# Patient Record
Sex: MALE | URBAN - METROPOLITAN AREA
[De-identification: names, ages, dates, MRNs, and addresses within clinical notes are randomized per-mention and may not be internally consistent; named-entity substitution may affect disease eponyms.]

---

## 2024-03-19 ENCOUNTER — TELEPHONE (OUTPATIENT)
Age: 44
End: 2024-03-19

## 2024-03-19 NOTE — TELEPHONE ENCOUNTER
Called and spoke with Yohana, asked her if should would be able to fax us the biopsy results, she said she is going to call the doctor and have them fax them over to us.

## 2024-03-19 NOTE — TELEPHONE ENCOUNTER
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 Please advise where we can get this patient scheduled. [phone]

## 2024-03-20 NOTE — TELEPHONE ENCOUNTER
Yohana caldera's husb calling to check status of fax    Advised I did not see anything in his chart     Verified fax # with her, apparently wrong # was given    Instructed her to have prov refax to us

## 2024-03-25 NOTE — TELEPHONE ENCOUNTER
Rec'd call from patients wife checking to see if paperwork was yet received for her ? I stated that it had not. There is nothing in his chart.    She stated that this is the third time that she's call and we don't have it. Should she just go somewhere else?     I informed her that no matter where she went, records would be required.    She wanted to confirm fax number but couldn't find her table.    She'll call back.

## 2024-03-28 NOTE — TELEPHONE ENCOUNTER
Path report received via e-mail. Forwarded to office to print and scan into chart to send to MOHs / clinical team.

## 2024-03-28 NOTE — TELEPHONE ENCOUNTER
Issues with e-mail received (?). Requested patient's wife resend (with my apologies) so I can get physician to review.

## 2024-04-02 ENCOUNTER — TELEPHONE (OUTPATIENT)
Dept: DERMATOLOGY | Facility: CLINIC | Age: 44
End: 2024-04-02

## 2024-04-02 NOTE — LETTER
Dago Warner     1980    [address] [address]    Dear Dago Warner,    You are scheduled to have the MOHS procedure on May 15, 2024 at 11 am for middle finger with Dr.Ryan Layton. Our office is located in [facility] our address is [address] [place] [address]. Once you arrive please check in with our front staff in [address] and they will escort you to the MOHS waiting room.  If you have someone bringing you to your appointment they may wait in the waiting room or accompany you in your visit.      Below you will find some pre-op instructions along with some information regarding the MOHS procedure.     If you have any questions please call our office at [phone].       Thank you,    [facility] MOHS Department         PRE-OPERATIVE INSTRUCTIONS - MOHS    Before your scheduled surgery, there are a number of important precautions and positive steps you should take to help prepare yourself for a successful treatment and speedy recovery.    Some of the steps, which are listed below, may seem unnecessary and inconvenient, but they are important. For example, when you stop smoking, you increase your ability to heal. Occasionally, there may be valid reasons, personal or medical, why you can't comply. In such cases, please call the office so we can discuss possible ways to overcome any obstacles you may be encountering.    If you have any questions about the surgery, or remember additional medical information that you forgot to mention to our staff, please contact the office prior to your surgery.    GENERAL INFORMATION REGARDING MOHS MICROGRAPHIC SURGERY    Mohs surgery is a specialized technique for the removal of skin cancer developed by Dr. Frederick Mohs over 50 years ago to improve the cure rates of skin cancer. Traditionally, skin cancers are treated by destructive methods (radiation, freezing, scraping, and burning)  or excision (cutting out the tissue with standards margins and sending it to an outside laboratory for testing). These methods all yield cure rates between 65%-94%. However, for cancers located in cosmetically sensitive areas, large tumors, or tumors unsuccessfully treated by other means, Mohs surgery offers a higher cure rate. In most cases, Mohs surgery provides you with a 99% cure rate for primary (previously untreated) basal cell cancer and a 95% cure rate for primary squamous cell cancer. In Mohs surgery, tissue is removed and processed in a way that we are able to check 100% of the margins, giving the highest cure rate for any method of treating skin cancers while providing maximal preservation of normal skin. This allows the surgeon to produce an optimal cosmetic result for the patient by maximizing the amount of tissue removed yielding as small a scar as possible    On the day of surgery, you will be given local anesthesia only (similar to what was given to you during your initial biopsy). You will remain awake. You will verify the location of the skin cancer prior to the onset of the surgery. Once the area is numb, the tissue containing the skin cancer will be removed, taking a small safety margin. This margin is usually smaller than what would be taken with a standard excision. Once the tissue is removed, it is marked and oriented. The first layer (“Stage I”) will be processed in our laboratory. The wound will be treated for bleeding and a bandage will be placed to keep you comfortable while you wait an approximate 45 minutes-1 hour (for the processing of the tissue) in your room. Your Mohs surgeon will examine the pathology in the lab, checking all the margins. If any tumor remains, you will need to take a second layer of skin (“Stage 2”). The area will be re-anesthetized and your Mohs surgeon will remove more skin only in the area where the tumor exists. This process will continue until all the skin  cancer is removed. Unfortunately, there is no method to predict how many layers or stages will be taken.    Once the tumor has been removed completely, we will discuss the best ways to close the defect. Most wounds may be closed with stitches. A larger wound may require a skin graft or a flap. In rare instances, especially for cancers around the eye or for larger cancers, we may work with another surgeon (oculoplastic, ENT, plastics) with special skills to assist with reconstruction.    Medications: Please take all your normal medications the morning of your surgery. If you are a diabetic, please bring your insulin or medications with you, as well as a snack to avoid having low blood sugar during your day with us.     Blood Thinners    VERY IMPORTANT: We do NOT stop or hold prescribed blood thinners (such as Coumadin/Warfarin, Plavix, Eliquis, Pradaxa, Brilinta, Apixaban, Xarelto, Lovonox, Rivaroxaban, or Aggrenox) before Mohs surgery. Additionally, If you take aspirin because you have had a stroke, heart attack, heart disease, other condition, or your physician has prescribed you to take it, please continue your aspirin.    Although there is a risk of increased bruising and bleeding, we are still able to safely perform surgery while continuing these medications. Please NEVER stop your prescribed blood thinner without the managing doctor's (the doctor that prescribed the medication) permission or knowledge. If you have any concerns about not holding your blood thinner, please address these with your Mohs surgeon.    Most people should stop all non-steroidal anti-inflammatory medications (Motrin, Naproxen, Advil, Midol, Aleve, etc.) for 7 days prior to your scheduled surgery and 2 days after (unless instructed otherwise after surgery).  You may take Tylenol for pain.     Antibiotics  If you usually require antibiotics prior to dental work, please let the office know at least 24 hours prior to your surgery. Medical  conditions that sometimes require preoperative antibiotics include artificial heart valves, heart murmurs, artificial joints, and related problems. We may give you a different medication than the dentist, so please contact us for the correct antibiotic.  If you were prescribed pre-operative antibiotics by our office, please take the medication 2 hours prior to your procedure.    Vitamins and Supplements  Avoid taking any supplements with Vitamin E, Fish Oil, Gingko, Ginseng, and Garlic for 2 weeks before and 2 days after your surgery. These thin your blood.    Alcohol: Avoid drinking alcohol for 2 days prior to your surgery, and for 2 days afterwards (it thins the blood and causes more bruising and swelling).    Smoking: Try to STOP or reduce smoking significantly the week before your surgery, and especially the week afterwards (it greatly improves how well you heal). Tobacco smoke deprives the blood of oxygen, which is urgently needed by the wound during the healing process.    Contact Lenses: Do not wear them on the day of the surgery. Instead, wear glasses and bring your case, in case we need to remove them.    Clothing: Do not wear your nicest clothing on your surgery day. We recommend wearing a button down shirt that will not disrupt your post-operative dressing when changing later that night.    Bathing: On the morning of your surgery, you may bathe or shower normally. If you get your hair done on a weekly basis, remember to get your hair washed the day before surgery.   - You will need to keep your surgical site dry for a minimum of 48 hours after surgery.    Makeup: If your surgery is on the face, please do not wear any makeup on the day of the surgery.    Jewelry: Please try to avoid wearing jewelry on the day of surgery.    Food: On the morning of surgery, have breakfast but limit your intake of caffeinated beverages. They are diuretic and may inconvenience you during surgery. If you are following up with  another surgeon the same day as your Mohs surgery, you must receive permission to eat breakfast from that surgeon.      What to bring with you on the day of your surgery:  Bring snacks - Since you could be at the office long, you may bring snacks and/or lunch with you. Some snacks and drinks are available at the office as well.   Bring a sweater - Bring a sweater or jacket that buttons or zips down the front and will not disturb your wound dressing during removal.  Bring something to do - You will be spending much of the day in our office. There will be 45-60 minute waiting periods  between layers/stages, and while there is a television with cable in every room, it is nice to have something to keep you occupied such as books, magazines, knitting, music, or work.     Planning Ahead:  Other Appointments - It is important to realize that no matter how small the skin cancer appears to be, looks can be deceiving. Since your surgery may last the entire day, you should not schedule any other appointments that day.  Special Occasions - Surgery often creates swelling and bruising. Also, the post-op dressing may be rather large and obvious. Keep this in mind as you arrange your social/work schedule. If an important event is already planned, please check with your referring physician or your Mohs surgeon to see if the surgery can be postponed.  Activity Limits after Surgery - If surgery was performed on your face, we recommend that you keep your activity level to a minimum for 2-3 days (the blood pressure elevation related to exercise can lead to bleeding). If you have stitches in an area that will be under tension or significant movement (neck, back, arms, legs), you will need to avoid heavy lifting (anything over 5 lbs) or exercise for at least 2 weeks and possibly longer. We also advise that you limit out of town travel for the first 7 days after surgery. You should also wait at least 7 days before going into a pool or the  ocean.  Housework - Since you will need to minimize activity after surgery, plan to do your groceries, laundry, gardening, and other heavy household chores prior to your surgery. Please make arrangements for assistance during the post-op period. If surgery is around your mouth area, you may need to eat soft foods, such as soup, milkshakes, or yogurt for 48 hours.    Purchasing bandage supplies: Prior to surgery, please purchase the following items to care for your surgical wound properly.  Cotton swabs (Q-tips)  Vaseline or Aquaphor  Telfa pads (or any non-stick dressing)  Paper tape or Hypafix tape  Gauze pads (3x3)      We will provide you with some bandage supplies after surgery to get you started.    Transportation: It is often reassuring and comforting to have a  drive you to and from the surgery. He or she is welcome to wait in the office during the surgery. Please note that for safety reasons, only the patient is allowed in the procedure room during surgery. Thank you for your cooperation.    Rescheduling: If you need to reschedule your surgery, please notify the office as soon as possible.

## 2024-04-02 NOTE — TELEPHONE ENCOUNTER
Pre- operative Mohs Telephone Scheduling Note    Do you have a pacemaker, defibrillator, spinal or brain stimulator? no    Do you take antibiotics before skin or dental procedures? no  If yes, will likely require pre-operative antibiotics. Ask  the patient why they take the antibiotics (usually because of joint replacement).    Do you have a history of a joint replacements within the past 2 years? no   If yes, will likely require pre-operative antibiotics. Ask if orthopaedic surgeon has prescribed pre-operative antibiotics to take before procedures/dental work?    Do you take any OTC medications that thin your blood (Aspirin, Aleve, Ibuprofen) or supplements that thin your blood (fish oil, garlic, vitamin E, Ginko Biloba)? no    Do you take any prescribed medications that thin your blood (Coumadin, Plavix, Xarelto, Eliquis or another prescribed blood thinner)? no    Do you have an allergy to lidocaine or epinephrine? no    Do you have allergies to Iodine? no    Do you wear a lidocaine patch? no    Have you ever been diagnosed with HIV, AIDS, Hep B and Hep C? no    Do you use a cane, walker or wheelchair? no    Is the patient from a nursing home? no If yes, Is there any special accommodations that is needed for patient n/a    Do you smoke? yes: pack per day      If yes,  patient to try and stop 2 days before surgery and 7 days after the surgery. Minimizing smoking as much as possible during this time will improve healing and the cosmetic result after surgery.    Do you use supplemental oxygen? If so, how many liters and can you be off it for a short period of time? N/a    Date scheduled: May 15, 2024 at 11 am with Dr. Layton    Coordination of Care with other provider (Oculoplastics, Plastics, ENT) required? no   IF YES, PLEASE FORWARD TO APPROPRIATE PERSONNEL TO HELP COORDINATE.    Are there remaining tumors to be scheduled? no    Was Prior Authorization obtained? No (please use [email or web address] to document  prior auth)

## 2024-05-15 ENCOUNTER — PROCEDURE VISIT (OUTPATIENT)
Dept: DERMATOLOGY | Facility: CLINIC | Age: 44
End: 2024-05-15
Payer: COMMERCIAL

## 2024-05-15 VITALS
WEIGHT: 209 LBS | SYSTOLIC BLOOD PRESSURE: 116 MMHG | HEART RATE: 90 BPM | DIASTOLIC BLOOD PRESSURE: 62 MMHG | TEMPERATURE: 97.6 F | OXYGEN SATURATION: 97 %

## 2024-05-15 DIAGNOSIS — D04.61 SQUAMOUS CELL CARCINOMA IN SITU (SCCIS) OF SKIN OF FINGER OF RIGHT HAND: Primary | ICD-10-CM

## 2024-05-15 PROCEDURE — 17311 MOHS 1 STAGE H/N/HF/G: CPT | Performed by: DERMATOLOGY

## 2024-05-15 RX ORDER — GEMFIBROZIL 600 MG/1
TABLET, FILM COATED ORAL
COMMUNITY

## 2024-05-15 NOTE — PROGRESS NOTES
MOHS Procedure Note    Patient: Dago Warner  : 1980  MRN: [id number]  Date: 5/15/2024    History of Present Illness: The patient is a 44 y.o. 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     Past Medical History:   Diagnosis Date    Squamous cell skin cancer 2024    finger, mohs       Past Surgical History:   Procedure Laterality Date    MOHS SURGERY  05/15/2024    SCCIS finger, Dr Layton         Current Outpatient Medications:     gemfibrozil (LOPID) 600 mg tablet, take 1 tablet by mouth twice a day for 90 days, Disp: , Rfl:     metFORMIN (GLUCOPHAGE) 1000 MG tablet, Take 1,000 mg by mouth 2 (two) times a day with meals, Disp: , Rfl:     No Known Allergies    Physical Exam:   Vitals:    05/15/24 1050   BP: 116/62   Pulse: 90   Temp: 97.6 °F (36.4 °C)   SpO2: 97%     General: Awake, Alert, Oriented x 3, Mood and affect appropriate  Respiratory: Respirations even and unlabored  Cardiovascular: Peripheral pulses intact; no edema  Musculoskeletal Exam: n/a    Skin: 1.2 cm x 0.9 cm scaly pink macule    Assessment: Biopsy confirmed 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     Plan: Mohs    Time of H&P Completion:1051    MOHS Procedure Timeout      Flowsheet Row Most Recent Value   Timeout: 1103   Patient Identity Verified: Yes   Correct Site Verified: Yes   Correct Procedure Verified: Yes            MOHS Diagnosis/Indication/Location/ID      Flowsheet Row Most Recent Value   Pathology Type Squamous cell carcinoma   Anatomic Site --  [finger]   Indications for MOHS tumor location   MOHS ID [id number]            MOHS Site/Accession/Pre-Post      Flowsheet Row Most Recent Value   Original Site Identified (as submitted by referring clinician) Photo, Referral   Biopsy Accession/Specimen # (as submitted by referring clincian) [id number]   Pre-MOHS Size Length (cm) 1.2   Pre-MOHS Size Width (cm) 0.9   Post-MOHS Size-Length (cm) 1.2   Post MOHS Size-Width (cm) 1    Repair Type Second intention   Anesthetic Used 1% Lidocaine with epinephrine  [1.4]            MOHS Tumor Stage 1 Information      Flowsheet Row Most Recent Value   Tissue Sections (blocks) 2   Microscopic Exam Section 1: No tumor identified in section.   Microscopic Exam Section 2: No tumor identified in section.   Tumor Clear After Stage I? Yes                    Patient identified, procedure verified, site identified and verified. Time out completed. Surgical removal of the lesion discussed with the patient (risks and benefits, including possibility of scarring, infection, recurrence or potential for further treatment)  I have specifically identified the site with the patient. I have discussed the fact that the patient will have a scar after the procedure regardless of granulation or repair with sutures. I have discussed that the repair options can range from granulation in some cases to linear or curvilinear closures to larger flaps or grafts.  There are sometimes flaps or grafts used that require multiples stages of surgery and will not be completed today, rather be completed over a series of appointments. I have discussed that occasionally due to location, size or depth of the lesion I may recommend consultation with and transfer of care for further removal or the reconstruction to another provider such as ophthalmology surgery, plastic surgery, ENT surgery, or surgical oncology. There are cases in which other testing such as imaging with MRI or CT scan or testing of lymph nodes is recommended because of the nature/depth/location of tumor seen during the removal. There is a risk of injury to nerves causing temporary or permanent numbness or the inability to move muscles full such as the inability to lift eyebrows. Questions answered and verbal and written consent was obtained.    The tumor qualifies for Mohs based on AUC criteria. Dr. Layton served as the surgeon and pathologist during the  procedure.    SCCIS cleared with 1 stage of mohs and allowed to heal secondarily after detailed discussion of repair options.  Well tolerated.  Wound check in 2 weeks.    Scribe Attestation      I,:  Jenifer Phillips am acting as a scribe while in the presence of the attending physician.:       I,:  Timmy Layton MD personally performed the services described in this documentation    as scribed in my presence.:

## 2024-05-15 NOTE — PATIENT INSTRUCTIONS
"Mohs Microscopic Surgery After Care    Your wound will heal via secondary intention, which means no additional surgery was performed after removal of your skin cancer. The wound was left open to heal gradually over time using wound care alone.     Wounds left to heal secondarily can take 2-3 months on average to heal.  The healing occurs step by step. You will notice that over the first three weeks the area will drain straw colored fluid that may have some blood within it. This is normal. Over the first three weeks, you form a nice healing base called fibrin that serves as the scaffold or map for the rest of your body's healing process. The fibrin is a thick yellow tissue. People often worry that it is pus given the yellow color. Unlike pus, this is a thick layer that cannot be rubbed off. After this, you should expect the wound to \"fill in\" to the surface of your skin over the course of several weeks. Lastly, a new layer of skin will form over the wound.    Until your body forms a good fibrin base (which takes ~3 weeks) you should avoid immersing the wound in water (such as in baths, whirlpools, swimming pools, hot tubs, lakes and oceans). You however CAN and should wash the area daily, as instructed below.     After healing, the scar will initially be red (and often times a deep red to purple color on the legs) but will gradually fade over the course of 1 year to a round scar lighter than the skin surrounding it.    We advise you follow the wound care as noted below the entire time it takes for the areas to heal completely.    WOUND CARE AFTER YOUR PROCEDURE    Try NOT to remove the pressure bandage for 48 hours. Keep the area clean and dry while this bandage is on.     After removing the bandage for the first time, gently clean the area with soap and water. If the bandage is difficult to remove, getting the bandage wet in the shower will sometimes help soften the adhesive and allow it to be removed more easily. "     You will now need to cleanse this area daily in the shower with gentle soap. There is no need to scrub the area. You will need to apply plain Vaseline ointment (this is over the counter and not a prescription) to the site until it has healed over completely followed by a clean appropriately sized bandage to area.  Non stick dressing and paper tape (or Hypafix) are recommended for sensitive skin but a bandaid is fine if it covers the area well.    MANAGING YOUR PAIN AFTER SURGERY     You can expect to have some pain after surgery. This is normal. The pain is typically worse the first two days after surgery, and quickly begins to get better.     The best strategy for controlling your pain after surgery is around the clock pain control. You can take over the counter Acetaminophen (Tylenol) for discomfort, if no contraindications.     If you are taking this at the maximum dose, you can alternate this with Motrin (ibuprofen or Advil) as well. Alternating these medications with each other allows you to maximize your pain control. In addition to Tylenol and Motrin, you can use heating pads or ice packs on your incisions to help reduce your pain.     How will I alternate your regular strength over-the-counter pain medication?  You will take a dose of pain medication every three hours.   Start by taking 650 mg of Tylenol (2 pills of 325 mg)   3 hours later take 600 mg of Motrin (3 pills of 200 mg)   3 hours after taking the Motrin take 650 mg of Tylenol   3 hours after that take 600 mg of Motrin.    See example - if your first dose of Tylenol is at 12:00 PM     12:00 PM  Tylenol 650 mg (2 pills of 325 mg)    3:00 PM  Motrin 600 mg (3 pills of 200 mg)    6:00 PM  Tylenol 650 mg (2 pills of 325 mg)    9:00 PM  Motrin 600 mg (3 pills of 200 mg)    Continue alternating every 3 hours      Important:   Do not take more than 4000mg of Tylenol or 3200mg of Motrin in a 24-hour period.     What if I still have pain?   If you have  pain that is not controlled with the over-the-counter pain medications (Tylenol and Motrin or Advil), don't hesitate to call our staff using the number provided. We will help make sure you are managing your pain in the best way possible, and if necessary, we can provide a prescription for additional pain medication.     CALL OUR OFFICE IMMEDIATELY FOR ANY SIGNS OF INFECTION:    This includes fever, chills, increased redness, warmth, tenderness, severe discomfort/pain, or pus or foul smell coming from the wound. If you are experiencing any of the above, please call [facility]'s Mohs Department directly at [phone].    IF BLEEDING IS NOTICED:    Place a clean cloth over the area and apply firm pressure for thirty minutes.  Check the wound ONLY after 30 minutes of direct pressure; do not cheat and sneak a peak, as that does not count.  If bleeding persists after 30 minutes of legitimate direct pressure, then try one more round of direct pressure to the area.  Should the bleeding become heavier or not stop after the second attempt, call [facility] directly at [phone]. Your call will get routed to the dermatology surgeon on call even after hours.

## 2024-06-03 ENCOUNTER — OFFICE VISIT (OUTPATIENT)
Dept: DERMATOLOGY | Facility: CLINIC | Age: 44
End: 2024-06-03
Payer: COMMERCIAL

## 2024-06-03 DIAGNOSIS — Z48.89 ENCOUNTER FOR POST SURGICAL WOUND CHECK: Primary | ICD-10-CM

## 2024-06-03 PROCEDURE — 99212 OFFICE O/P EST SF 10 MIN: CPT | Performed by: DERMATOLOGY

## 2024-06-03 NOTE — PROGRESS NOTES
WOUND CHECK    Physical Exam:  Anatomic Location Affected:  finger  Description of wound: well healing wound, fibrin covered wound bed  Closure Type: secondary intention    Additional History of Present Condition:  s/p Mohs sx on 5/15/24    Assessment and Plan:  Based on a thorough discussion of this condition and the management approach to it (including a comprehensive discussion of the known risks, side effects and potential benefits of treatment), the patient (family) agrees to implement the following specific plan:  Patient will continue to cleanse wound daily and as needed.   Patient will continue to apply vaseline and a bandage until fully healed.   Patient will follow up PRN.        Well healing wound, bandage applied and wound care discussed.  Wound check prn.    Scribe Attestation      I,:  Miriam Phelps RN am acting as a scribe while in the presence of the attending physician.:       I,:  Timmy Layton MD personally performed the services described in this documentation    as scribed in my presence.: